# Patient Record
Sex: MALE | Race: WHITE | NOT HISPANIC OR LATINO | Employment: OTHER | URBAN - METROPOLITAN AREA
[De-identification: names, ages, dates, MRNs, and addresses within clinical notes are randomized per-mention and may not be internally consistent; named-entity substitution may affect disease eponyms.]

---

## 2021-12-25 ENCOUNTER — NURSE TRIAGE (OUTPATIENT)
Dept: OTHER | Facility: OTHER | Age: 36
End: 2021-12-25

## 2021-12-25 DIAGNOSIS — Z20.828 EXPOSURE TO SARS-ASSOCIATED CORONAVIRUS: Primary | ICD-10-CM

## 2021-12-26 PROCEDURE — 87636 SARSCOV2 & INF A&B AMP PRB: CPT | Performed by: FAMILY MEDICINE

## 2023-12-29 ENCOUNTER — OFFICE VISIT (OUTPATIENT)
Dept: URGENT CARE | Facility: CLINIC | Age: 38
End: 2023-12-29
Payer: OTHER GOVERNMENT

## 2023-12-29 VITALS
HEART RATE: 78 BPM | HEIGHT: 73 IN | OXYGEN SATURATION: 98 % | RESPIRATION RATE: 18 BRPM | WEIGHT: 235.8 LBS | SYSTOLIC BLOOD PRESSURE: 138 MMHG | TEMPERATURE: 97.8 F | DIASTOLIC BLOOD PRESSURE: 86 MMHG | BODY MASS INDEX: 31.25 KG/M2

## 2023-12-29 DIAGNOSIS — J06.9 VIRAL URI: Primary | ICD-10-CM

## 2023-12-29 PROCEDURE — 99213 OFFICE O/P EST LOW 20 MIN: CPT | Performed by: FAMILY MEDICINE

## 2023-12-29 PROCEDURE — 87636 SARSCOV2 & INF A&B AMP PRB: CPT | Performed by: FAMILY MEDICINE

## 2023-12-29 RX ORDER — PREDNISONE 20 MG/1
20 TABLET ORAL 2 TIMES DAILY WITH MEALS
Qty: 8 TABLET | Refills: 0 | Status: SHIPPED | OUTPATIENT
Start: 2023-12-29

## 2023-12-29 NOTE — PROGRESS NOTES
[facility]        NAME: Demario Zarate is a 38 y.o. male  : 1985    MRN: [id number]  DATE: 2023  TIME: 2:40 PM    Assessment and Plan   Viral URI [J06.9]  1. Viral URI  Covid/Flu- Office Collect Normal    predniSONE 20 mg tablet            Patient Instructions     Flu/COVID swab collected today and sent to the pharmacy. Results will likely return in 24 hours. Quarantine guidelines discussed. OTC supplements/medications discussed. Follow-up with PCP in the next 1-2 days for re-evaluation if symptoms continue or worsen. Go to the ED if any fevers, unable to stay hydrated, abdominal pain, chest pain, shortness of breath, wheezing, chest tightness, cough or cold symptoms, new or worsening symptoms or other concerning symptoms.     Chief Complaint     Chief Complaint   Patient presents with    Cold Like Symptoms     Pt here ill x1 week pt states s/s  congestion, sore throat, stuffy ears, loss of voice, cough, no fever.  Pt used Dayquil and Nyquil.  No Covid test done.          History of Present Illness     Demario Zarate is a 38 y.o. male presenting to the office today for upper respiratory complaints.   Symptoms have been present for 7 days, and include cough and congestion.   He has tried otc decongestants for his symptoms, with no relief.  Sick contacts include: NA    Review of Systems     Review of Systems   Constitutional:  Negative for chills, fatigue and fever.   HENT:  Positive for congestion, postnasal drip and sore throat. Negative for ear discharge, ear pain, sinus pressure and sinus pain.    Eyes:  Negative for pain and discharge.   Respiratory:  Positive for cough. Negative for shortness of breath.    Cardiovascular:  Negative for chest pain and palpitations.   Gastrointestinal:  Negative for abdominal pain, diarrhea, nausea and vomiting.   Genitourinary:  Negative for difficulty urinating and dysuria.   Musculoskeletal:  Negative for arthralgias and myalgias.   Skin:   "Negative for rash.   Neurological:  Negative for dizziness, syncope, light-headedness, numbness and headaches.   Psychiatric/Behavioral:  Negative for agitation.    All other systems reviewed and are negative.      Current Medications       Current Outpatient Medications:     predniSONE 20 mg tablet, Take 1 tablet (20 mg total) by mouth 2 (two) times a day with meals, Disp: 8 tablet, Rfl: 0    Current Allergies     Allergies as of 12/29/2023 - Reviewed 12/29/2023   Allergen Reaction Noted    Penicillins Other (See Comments) 11/23/2005            The following portions of the patient's history were reviewed and updated as appropriate: allergies, current medications, past family history, past medical history, past social history, past surgical history and problem list.     Past Medical History:   Diagnosis Date    Patient denies medical problems        Past Surgical History:   Procedure Laterality Date    ABDOMINAL SURGERY      fora hematoma    HERNIA REPAIR      KNEE SURGERY Left     x2    REPAIR LABRUM Left     shoulder       Family History   Problem Relation Age of Onset    Cancer Mother     Diabetes Father        Medications have been verified.    Objective     /86   Pulse 78   Temp 97.8 °F (36.6 °C)   Resp 18   Ht 6' 1\" (1.854 m)   Wt 107 kg (235 lb 12.8 oz)   SpO2 98%   BMI 31.11 kg/m²   No LMP for male patient.     Physical Exam     Physical Exam  Vitals reviewed.   Constitutional:       General: He is not in acute distress.     Appearance: Normal appearance. He is not ill-appearing.   HENT:      Head: Normocephalic and atraumatic.      Right Ear: Tympanic membrane and ear canal normal. No middle ear effusion.      Left Ear: Tympanic membrane and ear canal normal.  No middle ear effusion.      Mouth/Throat:      Mouth: Mucous membranes are moist.      Pharynx: Posterior oropharyngeal erythema present. No oropharyngeal exudate.      Tonsils: No tonsillar exudate.   Eyes:      Extraocular Movements: " Extraocular movements intact.      Conjunctiva/sclera: Conjunctivae normal.      Pupils: Pupils are equal, round, and reactive to light.   Cardiovascular:      Rate and Rhythm: Normal rate and regular rhythm.      Pulses: Normal pulses.      Heart sounds: Normal heart sounds. No murmur heard.  Pulmonary:      Effort: Pulmonary effort is normal. No respiratory distress.      Breath sounds: Normal breath sounds. No wheezing.   Musculoskeletal:      Cervical back: Normal range of motion and neck supple. No tenderness.   Skin:     General: Skin is warm.   Neurological:      General: No focal deficit present.      Mental Status: He is alert.   Psychiatric:         Mood and Affect: Mood normal.         Behavior: Behavior normal.         Judgment: Judgment normal.

## 2023-12-30 LAB
FLUAV RNA RESP QL NAA+PROBE: NEGATIVE
FLUBV RNA RESP QL NAA+PROBE: NEGATIVE
SARS-COV-2 RNA RESP QL NAA+PROBE: NEGATIVE